# Patient Record
Sex: FEMALE | Race: BLACK OR AFRICAN AMERICAN | NOT HISPANIC OR LATINO | Employment: FULL TIME | URBAN - METROPOLITAN AREA
[De-identification: names, ages, dates, MRNs, and addresses within clinical notes are randomized per-mention and may not be internally consistent; named-entity substitution may affect disease eponyms.]

---

## 2018-03-07 ENCOUNTER — OFFICE VISIT (OUTPATIENT)
Dept: INTERNAL MEDICINE | Facility: CLINIC | Age: 43
End: 2018-03-07
Payer: COMMERCIAL

## 2018-03-07 VITALS
SYSTOLIC BLOOD PRESSURE: 126 MMHG | HEART RATE: 87 BPM | TEMPERATURE: 98 F | HEIGHT: 64 IN | OXYGEN SATURATION: 99 % | DIASTOLIC BLOOD PRESSURE: 82 MMHG | BODY MASS INDEX: 30.38 KG/M2 | WEIGHT: 177.94 LBS

## 2018-03-07 DIAGNOSIS — Z12.31 SCREENING MAMMOGRAM, ENCOUNTER FOR: ICD-10-CM

## 2018-03-07 DIAGNOSIS — Z00.00 ANNUAL PHYSICAL EXAM: Primary | ICD-10-CM

## 2018-03-07 PROCEDURE — 99999 PR PBB SHADOW E&M-EST. PATIENT-LVL III: CPT | Mod: PBBFAC,,, | Performed by: FAMILY MEDICINE

## 2018-03-07 PROCEDURE — 99386 PREV VISIT NEW AGE 40-64: CPT | Mod: S$GLB,,, | Performed by: FAMILY MEDICINE

## 2018-03-07 NOTE — PROGRESS NOTES
Katie Vidal  2018  [id number]    Primary Doctor No  Patient Care Team:  Primary Doctor No as PCP - General  Has the patient seen any provider outside of the [facility] network since the last visit? (no). If yes, HIPPA forms completed and records requested.        Visit Type:New patient raul    Chief Complaint:  No chief complaint on file.      History of Present Illness:  42 year old new patient, here for annual exam.    Dad in early 70, dx with Colon cancer recently.  Mother  of GYN cancer.    No past medical history.    She does smoke.  She would like stop smoking. She reports previous attempt to stop, she tried the patches that didn't work.   She would like to try medication.     She reports history of anxiety and depression.   She reports in  she went inpatient for depression and anxiety.   She reports currently not on any medication.  She reports she does some self coping mechanism.     History:  No past medical history on file.  Past Surgical History:   Procedure Laterality Date    TUBAL LIGATION       Family History   Problem Relation Age of Onset    Diabetes Mother     Diabetes Father     Hypertension Father     Hypertension Maternal Grandmother     Diabetes Maternal Grandmother     Diabetes Maternal Grandfather     Hypertension Paternal Grandmother     Diabetes Paternal Grandmother     Diabetes Paternal Grandfather      Social History     Social History    Marital status:      Spouse name: N/A    Number of children: N/A    Years of education: N/A     Occupational History    Not on file.     Social History Main Topics    Smoking status: Current Every Day Smoker     Packs/day: 1.00     Types: Cigarettes    Smokeless tobacco: Never Used    Alcohol use No    Drug use: No    Sexual activity: Yes     Partners: Male     Other Topics Concern    Not on file     Social History Narrative    No narrative on file     There is no problem list on file for this patient.    Review of  patient's allergies indicates:  No Known Allergies    The following were reviewed at this visit: active problem list, medication list, allergies, family history, social history, and health maintenance.    Medications:  Current Outpatient Prescriptions on File Prior to Visit   Medication Sig Dispense Refill    [DISCONTINUED] promethazine (PHENERGAN) 25 MG tablet Take 1 tablet (25 mg total) by mouth every 6 (six) hours as needed for Nausea. 15 tablet 0    [DISCONTINUED] ranitidine (ZANTAC) 75 MG tablet Take 75 mg by mouth 2 (two) times daily.      [DISCONTINUED] SIMETHICONE (GAS-X ORAL) Take by mouth.       No current facility-administered medications on file prior to visit.        Medications have been reviewed and reconciled with patient at this visit.  Barriers to medications present (no)    Adverse reactions to current medications (no)    Over the counter medications reviewed (Yes ), and if needed added to active Medication list at this visit.     Exam:  Wt Readings from Last 3 Encounters:   08/21/16 78 kg (172 lb)   11/23/15 75.8 kg (167 lb)   02/16/15 72.6 kg (160 lb)     Temp Readings from Last 3 Encounters:   08/21/16 98 °F (36.7 °C)   11/23/15 98.6 °F (37 °C) (Oral)   02/16/15 98.6 °F (37 °C) (Oral)     BP Readings from Last 3 Encounters:   08/21/16 (!) 110/59   11/23/15 111/65   02/16/15 117/65     Pulse Readings from Last 3 Encounters:   08/21/16 72   11/23/15 82   02/16/15 86     There is no height or weight on file to calculate BMI.    Review of Systems   Constitutional: Negative.  Negative for chills and fever.   HENT: Negative.  Negative for congestion, sinus pain and sore throat.    Eyes: Negative for blurred vision and double vision.   Respiratory: Negative for cough, sputum production, shortness of breath and wheezing.    Cardiovascular: Negative for chest pain, palpitations and leg swelling.   Gastrointestinal: Negative for abdominal pain, constipation, diarrhea, heartburn, nausea and vomiting.    Genitourinary: Negative.    Musculoskeletal: Negative.    Skin: Negative.  Negative for rash.   Neurological: Negative.    Endo/Heme/Allergies: Negative.  Negative for polydipsia. Does not bruise/bleed easily.   Psychiatric/Behavioral: Negative for depression and substance abuse.         Physical Exam   Constitutional: She is oriented to person, place, and time. She appears well-developed and well-nourished. No distress.   HENT:   Head: Normocephalic and atraumatic.   Right Ear: External ear normal.   Left Ear: External ear normal.   Nose: Nose normal.   Mouth/Throat: Oropharynx is clear and moist. No oropharyngeal exudate.   Eyes: Conjunctivae and EOM are normal. Pupils are equal, round, and reactive to light. Right eye exhibits no discharge. Left eye exhibits no discharge.   Neck: Normal range of motion. Neck supple. No thyromegaly present.   Cardiovascular: Normal rate, regular rhythm, normal heart sounds and intact distal pulses.    No murmur heard.  Pulmonary/Chest: Effort normal and breath sounds normal. No respiratory distress. She has no wheezes.   Abdominal: Soft. Bowel sounds are normal. She exhibits no distension and no mass. There is no tenderness.   Musculoskeletal: Normal range of motion. She exhibits no edema.   Lymphadenopathy:     She has no cervical adenopathy.   Neurological: She is alert and oriented to person, place, and time. No cranial nerve deficit.   Skin: Capillary refill takes less than 2 seconds. She is not diaphoretic.   Psychiatric: She has a normal mood and affect. Her behavior is normal. Judgment and thought content normal.   Nursing note and vitals reviewed.      Laboratory Reviewed ({Yes)  Lab Results   Component Value Date    WBC 13.42 (H) 08/21/2016    HGB 9.4 (L) 08/21/2016    HCT 30.8 (L) 08/21/2016     08/21/2016    ALT 12 08/21/2016    AST 14 08/21/2016     08/21/2016    K 4.0 08/21/2016     08/21/2016    CREATININE 0.6 08/21/2016    BUN 8 08/21/2016     CO2 22 (L) 08/21/2016       Assessment:  The primary encounter diagnosis was Annual physical exam. A diagnosis of Screening mammogram, encounter for was also pertinent to this visit.     Plan     Annual physical exam  -     CBC auto differential; Future; Expected date: 03/06/2018  -     Comprehensive metabolic panel; Future; Expected date: 03/06/2018  -     Lipid panel; Future; Expected date: 03/06/2018    Screening mammogram, encounter for  -     Mammo Digital Screening Bilat with CAD; Future; Expected date: 03/06/2018    Smoking Cessation referral    Labs ordered    Return for Well woman and PAP.      Care Plan/Goals: Reviewed  (N/A)  Goals     None          Follow up: No Follow-up on file.    After visit summary was printed and given to patient upon discharge today.  Patient goals and care plan are included in After Visit Summary.

## 2018-03-19 ENCOUNTER — TELEPHONE (OUTPATIENT)
Dept: SMOKING CESSATION | Facility: CLINIC | Age: 43
End: 2018-03-19

## 2018-03-19 NOTE — TELEPHONE ENCOUNTER
Left message about missed intake for the tobacco cessation program and asked if the patient would like to reschedule. Left my name Chacorta Torres with phone number [phone].

## 2018-03-26 ENCOUNTER — PATIENT OUTREACH (OUTPATIENT)
Dept: ADMINISTRATIVE | Facility: HOSPITAL | Age: 43
End: 2018-03-26